# Patient Record
Sex: MALE | Race: WHITE | Employment: UNEMPLOYED | URBAN - METROPOLITAN AREA
[De-identification: names, ages, dates, MRNs, and addresses within clinical notes are randomized per-mention and may not be internally consistent; named-entity substitution may affect disease eponyms.]

---

## 2017-04-05 ENCOUNTER — OFFICE VISIT (OUTPATIENT)
Dept: SURGERY | Age: 62
End: 2017-04-05

## 2017-04-05 VITALS
HEIGHT: 68 IN | OXYGEN SATURATION: 99 % | BODY MASS INDEX: 32.61 KG/M2 | WEIGHT: 215.2 LBS | HEART RATE: 67 BPM | RESPIRATION RATE: 24 BRPM | SYSTOLIC BLOOD PRESSURE: 146 MMHG | DIASTOLIC BLOOD PRESSURE: 91 MMHG

## 2017-04-05 DIAGNOSIS — M62.08 DIASTASIS RECTI: Primary | ICD-10-CM

## 2017-04-05 DIAGNOSIS — K42.9 UMBILICAL HERNIA WITHOUT OBSTRUCTION AND WITHOUT GANGRENE: ICD-10-CM

## 2017-04-05 NOTE — PROGRESS NOTES
The patient is a 62 y.o. man referred by Dong Dyer MD regarding possible abdominal wall hernia. The patient was noted by a nurse practitioner in Dr. Junie Marcus office to have a bulging in the epigastric region. The patient himself had not previously noted such. The patient reports that with regard to symptoms, he has no tenderness in the area. He  states that, since being told of this possible hernia, when he drinks a cup of hot tea or occasionally when he eats he will notice a mild discomfort in the epigastrium. He has not had any nausea or vomiting. He has no problem with bowel movements. The patient has no previous history of any abdominal surgery. His work does involve lifting. The patient has no history of heart disease or cardiac symptoms. He denies shortness of breath at rest or with exertion. Physical Examination:   GENERAL:  Alert, no acute distress. ABDOMEN:  Exam in the standing and supine positions shows no abdominal mass. When the patient rises from a supine position he is noted to have bulging in the epigastrium. I do not feel any fascial edge to suggest a true hernia. I inspected the abdominal wall with ultrasound from the xyphoid to just below the umbilicus and did not see any fascial defect in the epigastrium. There is a slight defect at the umbilicus itself. The patient has evidence of diastasis recti which is not a hernia and does not require intervention. He has a suggestion of a very minimal umbilical hernia which also does not require intervention. I do not have an explanation for the mild post prandial symptoms the patient has noted recently. Final Diagnosis:  Diastasis recti, no epigastric hernia noted, small asymptomatic umbilical hernia.     MedDATA/o     cc: Dong Dyer MD

## 2017-04-05 NOTE — MR AVS SNAPSHOT
Visit Information Date & Time Provider Department Dept. Phone Encounter #  
 4/5/2017  8:20 AM Yrn Meredith MD [facility] [address] [phone] [id number] Upcoming Health Maintenance Date Due Hepatitis C Screening 1955 COLONOSCOPY 12/16/2015 INFLUENZA AGE 9 TO ADULT 8/1/2016 ZOSTER VACCINE AGE 60> 9/25/2016 DTaP/Tdap/Td series (2 - Td) 10/17/2024 Allergies as of 4/5/2017  Review Complete On: 4/5/2017 By: Patricia Yoo No Known Allergies Current Immunizations  Reviewed on 10/20/2014 Name Date Influenza Vaccine 11/12/2013 Influenza Vaccine Split 1/30/2012 Influenza Vaccine Whole 10/28/2002 TD Vaccine 6/28/2005 Tdap 10/17/2014 Not reviewed this visit Vitals BP Pulse Resp Height(growth percentile) Weight(growth percentile) SpO2  
 (!) 146/91 (BP 1 Location: Right arm, BP Patient Position: Sitting) 67 24 5' 8\" (1.727 m) 215 lb 3.2 oz (97.6 kg) 99% BMI Smoking Status 32.72 kg/m2 Never Smoker BMI and BSA Data Body Mass Index Body Surface Area 32.72 kg/m 2 2.16 m 2 Preferred Pharmacy Pharmacy Name [address] [facility] [phone] Your Updated Medication List  
  
   
This list is accurate as of: 4/5/17  9:31 AM.  Always use your most recent med list. amLODIPine-benazepril 5-40 mg per capsule Commonly known as:  LOTREL  
TAKE 1 CAPSULE DAILY  
  
 aspirin 81 mg tablet  
take  by mouth daily. fluticasone 50 mcg/actuation nasal spray Commonly known as:  Sheela Lown SPRAY TWICE IN EACH NOSTRIL DAILY AS NEEDED FOR RHINITIS  
  
 pravastatin 40 mg tablet Commonly known as:  PRAVACHOL  
TAKE 1 TABLET EVERY EVENING  
  
 scopolamine 1.5 mg (1 mg over 3 days) Pt3d Commonly known as:  TRANSDERM-SCOP  
1 Patch by TransDERmal route every seventy-two (72) hours.  As needed motion sickness  
  
 sildenafil citrate 25 mg tablet Commonly known as:  VIAGRA Take 1 Tab by mouth daily as needed. Introducing [facility]! Dear Yobany Mckeon: 
Thank you for requesting a [employer]ine account. Our records indicate that you already have an active Abine account. You can access your account anytime at [email or web address] Did you know that you can access your hospital and ER discharge instructions at any time in Abine? You can also review all of your test results from your hospital stay or ER visit. Additional Information If you have questions, please visit the Frequently Asked Questions section of the Abine website at [email or web address] Remember, Abine is NOT to be used for urgent needs. For medical emergencies, dial 911. Now available from your iPhone and Android! Please provide this summary of care documentation to your next provider. Your primary care clinician is listed as Sylwia Vasquez. If you have any questions after today's visit, please call [phone].